# Patient Record
Sex: MALE | Race: WHITE | HISPANIC OR LATINO | Employment: FULL TIME | URBAN - NONMETROPOLITAN AREA
[De-identification: names, ages, dates, MRNs, and addresses within clinical notes are randomized per-mention and may not be internally consistent; named-entity substitution may affect disease eponyms.]

---

## 2023-09-06 ENCOUNTER — TELEPHONE (OUTPATIENT)
Dept: OTOLARYNGOLOGY | Facility: CLINIC | Age: 38
End: 2023-09-06
Payer: COMMERCIAL

## 2023-09-12 ENCOUNTER — OFFICE VISIT (OUTPATIENT)
Dept: OTOLARYNGOLOGY | Facility: CLINIC | Age: 38
End: 2023-09-12
Payer: COMMERCIAL

## 2023-09-12 VITALS
DIASTOLIC BLOOD PRESSURE: 85 MMHG | WEIGHT: 209.6 LBS | BODY MASS INDEX: 31.77 KG/M2 | HEART RATE: 83 BPM | TEMPERATURE: 98.9 F | HEIGHT: 68 IN | SYSTOLIC BLOOD PRESSURE: 154 MMHG

## 2023-09-12 DIAGNOSIS — K11.8 MASS OF RIGHT PAROTID GLAND: Primary | ICD-10-CM

## 2023-09-12 DIAGNOSIS — D49.0 PAROTID TUMOR: ICD-10-CM

## 2023-09-12 NOTE — PATIENT INSTRUCTIONS
CT neck ordered    Call for problems     CONTACT INFORMATION:  The main office phone number is [phone]. For emergencies after hours and on weekends, this number will convert over to our answering service and the on call provider will answer. Please try to keep non emergent phone calls/ questions to office hours 9am-5pm Monday through Friday.     [facility]  As an alternative, you can sign up and use the Epic MyChart system for more direct and quicker access for non emergent questions/ problems.  [facility] allows you to send messages to your doctor, view your test results, renew your prescriptions, schedule appointments, and more. To sign up, go to [employer] and click on the Sign Up Now link in the New User? box. Enter your [employer] Activation Code exactly as it appears below along with the last four digits of your Social Security Number and your Date of Birth () to complete the sign-up process. If you do not sign up before the expiration date, you must request a new code.    [employer]onate Activation Code: [email or web address]  Expires: 10/8/2023  8:40 AM    If you have questions, you can email [email or web address] or call [phone] to talk to our [employer] staff. Remember, [employer] is NOT to be used for urgent needs. For medical emergencies, dial 911.         [phone]

## 2023-09-12 NOTE — PROGRESS NOTES
Jaswant Stovall Jr, MD  [facility] [place] [facility] EAR NOSE & THROAT  [address], [address]  [place] [address]  Fax [phone]  Phone [phone]      Visit Type: NEW PATIENT   Chief Complaint   Patient presents with    Neck Mass     Bilateral-been present since his surgery 9 years ago  H/O exc left parotid        HPI  Accompanied by: Wife  He complains of neck mass.   He has had Left neck mass. Removed 9 yrs ago. Dr Cody removed.  Has had U/S and told patient bilateral neck mass.  Patient feels something in neck.  Smoke- none  Drink- socially  Feels lumps on Right side. He has noted for 3-4 yrs.   Surgery on Left side. Told patient that some present.      History reviewed. No pertinent past medical history.    Past Surgical History:   Procedure Laterality Date    PAROTID BIOPSY/TUMOR EXCISION Left        Family History: His family history is not on file.     Social History: He  reports that he has never smoked. He does not have any smokeless tobacco history on file. He reports current alcohol use. He reports that he does not use drugs.    Home Medications:       Allergies:  He has No Known Allergies.       Vital Signs:   Temp:  [98.9 °F (37.2 °C)] 98.9 °F (37.2 °C)  Heart Rate:  [83] 83  BP: (154)/(85) 154/85  ENT Physical Exam  Constitutional  Appearance: patient appears well-developed and well-nourished,  Communication/Voice: communication appropriate for developmental age; vocal quality normal;  Head and Face  Appearance: head appears normal, face appears normal and face appears atraumatic;  Palpation: facial palpation normal;  Salivary: glands normal; no salivary tenderness noted; Salivary comments: Laron incision well-healed; right tail of parotid with fatty infiltration, no definitive tumor  Ear  Hearing: intact;  Auricles: bilateral auricles normal;  External Mastoids: right external mastoid normal; left external mastoid normal;  Ear Canals: bilateral  ear canals normal;  Tympanic Membranes: bilateral tympanic membranes normal;  Nose  External Nose: nares patent bilaterally; external nose normal;  Internal Nose: nasal mucosa normal; septum normal; bilateral inferior turbinates normal;  Oral Cavity/Oropharynx  Lips: normal;  Teeth: normal;  Gums: gingiva normal;  Tongue: normal;  Oral mucosa: normal;  Hard palate: normal;  Soft palate: normal;  Tonsils: normal;  Base of Tongue: normal;  Posterior pharyngeal wall: normal;  Neck  Neck: neck normal; neck palpation normal;  Thyroid: thyroid normal;  Respiratory  Inspection: breathing unlabored; normal breathing rate;  Cardiovascular  Inspection: extremities are warm and well perfused; no peripheral edema present;  Lymphatic  Palpation: lymph nodes normal;  Neurovestibular  Mental Status: alert and oriented;  Psychiatric: mood normal; affect is appropriate;       Result Review    RESULTS REVIEW    I have reviewed the patients old records in the chart.   I have reviewed the patients old records in the chart.  The following results/records were reviewed:  I reviewed the patient's new imaging results and agree with the interpretation.  I reviewed the patient's other test results and agree with the interpretation   Referral for Localized swelling, mass and lump, neck (08/29/2023)   SCANNED - IMAGING (08/22/2023)   PROGRESS NOTES - SCAN - PROG NOTE_[facility]_08/03/23 (08/03/2023)    RADIOLOGY - SCAN - US NECK THYROID_[facility]_08/22/23 (08/22/2023)-report reviewed with the findings of no abnormalities on ultrasound.    SUMMARY OF CARE - SCAN - SUMMARY OF CARE_[facility]_08/28/23 (08/28/2023)    REFERRAL/PRE-AUTH MRN - SCAN - REF TO ENT_[facility]_08/28/23 (08/28/2023)  Assessment & Plan    Diagnoses and all orders for this visit:    1. Mass of right parotid gland (Primary)  Comments:  Tail of the parotid  Orders:  -     CT Soft Tissue Neck With Contrast; Future    2. Parotid tumor  Comments:  Left, by history, unknown pathology  Orders:  -     CT  Soft Tissue Neck With Contrast; Future       Medical and surgical options were discussed including observation, continued medical management, medication modification, surgical management, and CT scanning. Risks, benefits and alternatives were discussed and questions were answered. After considering the options, the patient decided to proceed with CT scanning.  Patient appears to have no definitive mass.  He is extremely concerned about this.  Despite the fact that he has an ultrasound which reveals no mass, he wishes further evaluation.  I have ordered CT scanning of his parotids.  If he has CT scan with contrast this may elucidate deeper masses.  I will see the patient back after CT scanning.  CT scan of the neck  Call for any growth in the face    Return RTC after CT neck, for Recheck R parotid, CT neck.      Electronically signed by Jaswant Stovall Jr, MD 09/12/23 2:30 PM CDT.

## 2023-09-27 ENCOUNTER — HOSPITAL ENCOUNTER (OUTPATIENT)
Dept: CT IMAGING | Facility: HOSPITAL | Age: 38
Discharge: HOME | End: 2023-09-27
Admitting: OTOLARYNGOLOGY
Payer: COMMERCIAL

## 2023-09-27 DIAGNOSIS — D49.0 PAROTID TUMOR: ICD-10-CM

## 2023-09-27 DIAGNOSIS — K11.8 MASS OF RIGHT PAROTID GLAND: ICD-10-CM

## 2023-09-27 LAB — CREAT BLDA-MCNC: 0.8 MG/DL (ref 0.6–1.3)

## 2023-09-27 PROCEDURE — 82565 ASSAY OF CREATININE: CPT

## 2023-09-27 PROCEDURE — 25510000001 IOPAMIDOL 61 % SOLUTION: Performed by: OTOLARYNGOLOGY

## 2023-09-27 PROCEDURE — 70491 CT SOFT TISSUE NECK W/DYE: CPT

## 2023-09-27 RX ADMIN — IOPAMIDOL 100 ML: 612 INJECTION, SOLUTION INTRAVENOUS at 15:06

## 2023-10-23 ENCOUNTER — TELEPHONE (OUTPATIENT)
Dept: OTOLARYNGOLOGY | Facility: CLINIC | Age: 38
End: 2023-10-23
Payer: COMMERCIAL

## 2023-10-23 NOTE — TELEPHONE ENCOUNTER
----- Message from Jaswant Stovall Jr., MD sent at 10/20/2023  1:17 PM CDT -----  Regarding: CT results  Please call patient tell him CT results do not indicate there is a mass in his parotid.  I will be happy to see him back in the office to discuss this with him.  ----- Message -----  From: Rhonda, Rad Results [place] In  Sent: 9/28/2023  10:11 AM CDT  To: Jaswant Stovall Jr., MD

## 2023-10-23 NOTE — TELEPHONE ENCOUNTER
I spoke to patient's wife, gave results of CT scan. Dr Stovall will discuss in detail at his follow up appt on 11-15-23. She VU

## 2023-10-23 NOTE — TELEPHONE ENCOUNTER
----- Message from Jaswant Stovall Jr., MD sent at 10/20/2023  1:17 PM CDT -----  Regarding: CT results  Please call patient tell him CT results do not indicate there is a mass in his parotid.  I will be happy to see him back in the office to discuss this with him.  ----- Message -----  From: Rhonda, Rad Results [place] In  Sent: 9/28/2023  10:11 AM CDT  To: Jaswant Stovall Jr., MD

## 2023-11-15 ENCOUNTER — OFFICE VISIT (OUTPATIENT)
Dept: OTOLARYNGOLOGY | Facility: CLINIC | Age: 38
End: 2023-11-15
Payer: COMMERCIAL

## 2023-11-15 VITALS
HEART RATE: 78 BPM | WEIGHT: 209 LBS | DIASTOLIC BLOOD PRESSURE: 84 MMHG | SYSTOLIC BLOOD PRESSURE: 153 MMHG | TEMPERATURE: 98.4 F | HEIGHT: 68 IN | BODY MASS INDEX: 31.67 KG/M2

## 2023-11-15 DIAGNOSIS — K11.8 MASS OF RIGHT PAROTID GLAND: Primary | ICD-10-CM

## 2023-11-15 DIAGNOSIS — D49.0 PAROTID TUMOR: ICD-10-CM

## 2023-11-15 NOTE — PROGRESS NOTES
Jaswant Stovall Jr, MD  [facility] EAR NOSE & THROAT  [address], [address]  [place] [address]  Fax [phone]  Phone [phone]      Visit Type: FOLLOW UP   Chief Complaint   Patient presents with    Follow up after CT     Parotid mass        HPI  Accompanied by: Wife  He presents for a follow up evaluation. No changes.  CT face completed     History reviewed. No pertinent past medical history.    Past Surgical History:   Procedure Laterality Date    PAROTID BIOPSY/TUMOR EXCISION Left        Family History: His family history is not on file.     Social History: He  reports that he has never smoked. He does not have any smokeless tobacco history on file. He reports current alcohol use. He reports that he does not use drugs.    Home Medications:       Allergies:  He has No Known Allergies.       Vital Signs:   Temp:  [98.4 °F (36.9 °C)] 98.4 °F (36.9 °C)  Heart Rate:  [78] 78  BP: (153)/(84) 153/84  ENT Physical Exam  Constitutional  Appearance: patient appears well-developed and well-nourished,  Communication/Voice: communication appropriate for developmental age; vocal quality normal;  Head and Face  Appearance: head appears normal, face appears normal and face appears atraumatic;  Palpation: facial palpation normal;  Salivary: glands normal; no salivary tenderness noted; Salivary comments: Laron incision well-healed; right tail of parotid with fatty infiltration, no definitive tumor  Ear  Hearing: intact;  Auricles: bilateral auricles normal;  External Mastoids: right external mastoid normal; left external mastoid normal;  Ear Canals: bilateral ear canals normal;  Tympanic Membranes: bilateral tympanic membranes normal;  Nose  External Nose: nares patent bilaterally; external nose normal;  Internal Nose: bilateral intranasal mucosa erythematous; nasal septal deviation present; deviation is to the right and to the left, septal deviation is  severe; Septum comments: L to R mid mod to severe; R to L low moderate   bilateral inferior turbinates erythematous;  Oral Cavity/Oropharynx  Lips: normal;  Teeth: normal;  Gums: gingiva normal;  Tongue: normal;  Oral mucosa: normal;  Hard palate: normal;  Soft palate: normal;  Tonsils: normal;  Base of Tongue: normal;  Posterior pharyngeal wall: normal;  Neck  Neck: neck normal; neck palpation normal;  Thyroid: thyroid normal;  Respiratory  Inspection: breathing unlabored; normal breathing rate;  Cardiovascular  Inspection: extremities are warm and well perfused; no peripheral edema present;  Lymphatic  Palpation: lymph nodes normal;  Neurovestibular  Mental Status: alert and oriented;  Psychiatric: mood normal; affect is appropriate;           Result Review    RESULTS REVIEW    I have reviewed the patients old records in the chart.   I have reviewed the patients old records in the chart.  The following results/records were reviewed:  No results found for this or any previous visit.     CT Soft Tissue Neck With Contrast (09/27/2023 15:05)-I have reviewed the CT scan independently, agree with radiology interpretation.  I see no sign of parotid tumor.    Assessment & Plan    Diagnoses and all orders for this visit:    1. Mass of right parotid gland (Primary)  Comments:  None present on CT or examination    2. Parotid tumor  Comments:  None present on CT or examination left side       Medical and surgical options were discussed including observation, continued medical management, medication modification, and surgical management. Risks, benefits and alternatives were discussed and questions were answered. After considering the options, the patient decided to proceed with observation.  Patient appears to have no evidence of clear parotid tumor.  He does have fatty infiltration in the right tail of the parotid with mild adenopathy.  This is indicated on the CT scan as well.  I recommended continue observation.  I will  follow the patient in 6 months and reexamine him.  No medications  For tumor growth    My Chart:  Patient is using My Chart    Patient, Spouse understand(s) and agree(s) with the treatment plan as described.    Return in about 6 months (around 5/15/2024) for Recheck parotids.      Electronically signed by Jaswant Stovall Jr, MD 11/15/23 3:42 PM CST.

## 2023-11-15 NOTE — PATIENT INSTRUCTIONS
Call for any growth     CONTACT INFORMATION:  The main office phone number is [phone]. For emergencies after hours and on weekends, this number will convert over to our answering service and the on call provider will answer. Please try to keep non emergent phone calls/ questions to office hours 9am-5pm Monday through Friday.     [facility]  As an alternative, you can sign up and use the Epic MyChart system for more direct and quicker access for non emergent questions/ problems.  [facility] allows you to send messages to your doctor, view your test results, renew your prescriptions, schedule appointments, and more. To sign up, go to [employer] and click on the Sign Up Now link in the New User? box. Enter your [facility] Activation Code exactly as it appears below along with the last four digits of your Social Security Number and your Date of Birth () to complete the sign-up process. If you do not sign up before the expiration date, you must request a new code.    [facility] Activation Code: Activation code not generated  Current [facility] Status: Active    If you have questions, you can email [email or web address] or call [phone] to talk to our [facility] staff. Remember, [facility] is NOT to be used for urgent needs. For medical emergencies, dial 911.

## 2024-07-22 ENCOUNTER — OFFICE VISIT (OUTPATIENT)
Dept: OTOLARYNGOLOGY | Facility: CLINIC | Age: 39
End: 2024-07-22
Payer: COMMERCIAL

## 2024-07-22 VITALS
TEMPERATURE: 98 F | SYSTOLIC BLOOD PRESSURE: 122 MMHG | HEART RATE: 81 BPM | DIASTOLIC BLOOD PRESSURE: 73 MMHG | WEIGHT: 206 LBS | BODY MASS INDEX: 31.32 KG/M2

## 2024-07-22 DIAGNOSIS — K11.8 MASS OF RIGHT PAROTID GLAND: Primary | ICD-10-CM

## 2024-07-22 DIAGNOSIS — D49.0 PAROTID TUMOR: ICD-10-CM

## 2024-07-22 DIAGNOSIS — K11.20 PAROTID SIALADENITIS: ICD-10-CM

## 2024-07-22 NOTE — PROGRESS NOTES
Jaswant Stovall Jr, MD  [facility] EAR NOSE & THROAT  [address], [address]  [place] [address]  Fax [phone]  Phone [phone]      Visit Type: FOLLOW UP   Chief Complaint   Patient presents with    follow up mass of right parotid gland     Feels like it has grown since last visit           HPI  Accompanied by:Wife  He presents for a follow up evaluation.  Patient states that he feels the right gland is slightly more full.  He notes no masses.  Wife is translating for us during this counter.    No past medical history on file.    Past Surgical History:   Procedure Laterality Date    PAROTID BIOPSY/TUMOR EXCISION Left        Family History: His family history is not on file.     Social History: He  reports that he has never smoked. He does not have any smokeless tobacco history on file. He reports current alcohol use. He reports that he does not use drugs.    Home Medications:       Allergies:  He has No Known Allergies.       Vital Signs:   Temp:  [98 °F (36.7 °C)] 98 °F (36.7 °C)  Heart Rate:  [81] 81  BP: (122)/(73) 122/73  ENT Physical Exam  Constitutional  Appearance: patient appears well-developed and well-nourished,  Communication/Voice: communication appropriate for developmental age; vocal quality normal;  Head and Face  Appearance: head appears normal, face appears normal and face appears atraumatic;  Palpation: facial palpation normal; no TMJ crepitus noted;  Salivary: glands normal; no salivary tenderness noted; Salivary comments: Laron incision well-healed; right tail of parotid with fatty infiltration, no definitive tumor, slightly more full than last visit  Ear  Hearing: intact;  Auricles: bilateral auricles normal;  External Mastoids: right external mastoid normal; left external mastoid normal;  Ear Canals: bilateral ear canals normal;  Tympanic Membranes: bilateral tympanic membranes normal;  Nose  External Nose: nares patent  bilaterally; external nose normal;  Internal Nose: bilateral intranasal mucosa erythematous; nasal septal deviation present; deviation is to the right and to the left, septal deviation is severe; Septum comments: L to R mid mod to severe; R to L low moderate   bilateral inferior turbinates erythematous;  Oral Cavity/Oropharynx  Lips: normal;  Teeth: normal;  Gums: gingiva normal;  Tongue: normal;  Oral mucosa: normal;  Hard palate: normal;  Soft palate: normal;  Tonsils: normal;  Base of Tongue: normal;  Posterior pharyngeal wall: normal;  Neck  Neck: neck normal; neck palpation normal;  Thyroid: thyroid normal;  Respiratory  Inspection: breathing unlabored; normal breathing rate;  Cardiovascular  Inspection: extremities are warm and well perfused; no peripheral edema present;  Lymphatic  Palpation: lymph nodes normal;  Neurovestibular  Mental Status: alert and oriented;  Psychiatric: mood normal; affect is appropriate;           Result Review       RESULTS REVIEW    I have reviewed the patients old records in the chart.   I have reviewed the patients old records in the chart.        Assessment & Plan  Mass of right parotid gland    Parotid tumor    Parotid sialadenitis       Orders Placed This Encounter   Procedures    CT Soft Tissue Neck With Contrast       Diagnosis Plan   1. Mass of right parotid gland  CT Soft Tissue Neck With Contrast    None detected today      2. Parotid tumor  CT Soft Tissue Neck With Contrast    None palpable today      3. Parotid sialadenitis      Suspect right mild parotid sialadenitis             Medical and surgical options were discussed including observation, continued medical management, medication modification, and surgical management. Risks, benefits and alternatives were discussed and questions were answered. After considering the options, the patient decided to proceed with observation.  Patient appears to have slightly more adiposis of the right inferior parotid.  I feel no  evidence of tumor.  The patient is coming up on 1 year since his last CT scan.  I will plan a CT scan at 1 year.  If he has no further evidence of tumor, I will follow clinically.  Salivary gland care  CT scan at 1 year  Call for any changes    My Chart:  Patient is using My Chart    Patient, Spouse understand(s) and agree(s) with the treatment plan as described.    Return in about 3 months (around 10/22/2024) for Recheck R parotid, CT.        Electronically signed by Jaswant Stovall Jr, MD 07/22/24 3:37 PM CDT.

## 2024-08-05 ENCOUNTER — HOSPITAL ENCOUNTER (OUTPATIENT)
Dept: CT IMAGING | Facility: HOSPITAL | Age: 39
Discharge: HOME | End: 2024-08-05
Admitting: OTOLARYNGOLOGY
Payer: COMMERCIAL

## 2024-08-05 DIAGNOSIS — K11.8 MASS OF RIGHT PAROTID GLAND: ICD-10-CM

## 2024-08-05 DIAGNOSIS — D49.0 PAROTID TUMOR: ICD-10-CM

## 2024-08-05 PROCEDURE — 25510000001 IOPAMIDOL 61 % SOLUTION: Performed by: OTOLARYNGOLOGY

## 2024-08-05 PROCEDURE — 70491 CT SOFT TISSUE NECK W/DYE: CPT

## 2024-08-05 RX ADMIN — IOPAMIDOL 100 ML: 612 INJECTION, SOLUTION INTRAVENOUS at 14:58

## 2024-08-06 ENCOUNTER — TELEPHONE (OUTPATIENT)
Dept: OTOLARYNGOLOGY | Facility: CLINIC | Age: 39
End: 2024-08-06
Payer: COMMERCIAL

## 2024-08-06 NOTE — TELEPHONE ENCOUNTER
----- Message from Jaswant Stovall sent at 8/6/2024  2:25 PM CDT -----  Regarding: CT results  Please call patient tell him there is no evidence of a mass in either of the parotid glands.  He does have some fullness in the glands but no evidence of anything that requires surgery.  ----- Message -----  From: Rhonda, Rad Results [place] In  Sent: 8/5/2024   4:12 PM CDT  To: Jaswant Stovall Jr., MD

## 2024-08-06 NOTE — TELEPHONE ENCOUNTER
"I left  for patient with results of CT neck, per Dr Stovall \"There is no evidence of a mass in either of the parotid glands.  He does have some fullness in the glands but no evidence of anything that requires surgery.\" He has follow up appt on 10-21-24 at 2:45. Call with any questions  "

## 2024-10-21 ENCOUNTER — OFFICE VISIT (OUTPATIENT)
Dept: OTOLARYNGOLOGY | Facility: CLINIC | Age: 39
End: 2024-10-21
Payer: COMMERCIAL

## 2024-10-21 VITALS
HEIGHT: 68 IN | DIASTOLIC BLOOD PRESSURE: 88 MMHG | HEART RATE: 83 BPM | BODY MASS INDEX: 32.13 KG/M2 | SYSTOLIC BLOOD PRESSURE: 132 MMHG | TEMPERATURE: 98.4 F | WEIGHT: 212 LBS

## 2024-10-21 DIAGNOSIS — K11.8 MASS OF RIGHT PAROTID GLAND: Primary | ICD-10-CM

## 2024-10-21 DIAGNOSIS — K11.20 PAROTID SIALADENITIS: ICD-10-CM

## 2024-10-21 PROCEDURE — 1159F MED LIST DOCD IN RCRD: CPT | Performed by: OTOLARYNGOLOGY

## 2024-10-21 PROCEDURE — 99213 OFFICE O/P EST LOW 20 MIN: CPT | Performed by: OTOLARYNGOLOGY

## 2024-10-21 PROCEDURE — 1160F RVW MEDS BY RX/DR IN RCRD: CPT | Performed by: OTOLARYNGOLOGY

## 2024-10-21 NOTE — PROGRESS NOTES
Jaswant Stovall Jr, MD  [facility] [place] EAR NOSE & THROAT  [address], [address]  [place] [address]  Fax [phone]  Phone [phone]      Visit Type: FOLLOW UP   Chief Complaint   Patient presents with    recheck parotid gland     Previous mass of parotid gland.  He states he is not having any symptoms            HPI  Accompanied by: Wife  He presents for a follow up evaluation. No changes noted right face     History reviewed. No pertinent past medical history.    Past Surgical History:   Procedure Laterality Date    PAROTID BIOPSY/TUMOR EXCISION Left        Family History: His family history is not on file.     Social History: He  reports that he has never smoked. He does not have any smokeless tobacco history on file. He reports current alcohol use. He reports that he does not use drugs.    Home Medications:       Allergies:  He has No Known Allergies.       Vital Signs:      ENT Physical Exam  Constitutional  Appearance: patient appears well-developed and well-nourished,  Communication/Voice: communication appropriate for developmental age; vocal quality normal;  Head and Face  Appearance: head appears normal, face appears normal and face appears atraumatic;  Palpation: facial palpation normal; no TMJ crepitus noted;  Salivary: glands normal; no salivary tenderness noted; Salivary comments: Left Laron incision well-healed; right tail of parotid with fatty infiltration, no definitive tumor, slightly more full than left side  Ear  Hearing: intact;  Auricles: bilateral auricles normal;  External Mastoids: right external mastoid normal; left external mastoid normal;  Ear Canals: bilateral ear canals normal;  Tympanic Membranes: bilateral tympanic membranes normal;  Nose  External Nose: nares patent bilaterally; external nose normal;  Internal Nose: bilateral intranasal mucosa erythematous (Mild); nasal septal deviation present; deviation is to the  right and to the left, septal deviation is severe; Septum comments: L to R mid mod to severe; R to L low moderate   bilateral inferior turbinates erythematous (Mild);  Oral Cavity/Oropharynx  Lips: normal;  Teeth: normal;  Gums: gingiva normal;  Tongue: normal;  Oral mucosa: normal;  Hard palate: normal;  Soft palate: normal;  Tonsils: normal;  Base of Tongue: normal;  Posterior pharyngeal wall: normal;  Neck  Neck: scars (Left Laron incision, well-healed, acceptable scar) present; neck palpation normal; no neck mass present;  Thyroid: thyroid normal;  Neck comments: Right parotid, fullness in the tail, fatty infiltrate  Respiratory  Inspection: breathing unlabored; normal breathing rate;  Cardiovascular  Inspection: extremities are warm and well perfused; no peripheral edema present;  Lymphatic  Palpation: lymph nodes normal;  Neurovestibular  Mental Status: alert and oriented;  Psychiatric: mood normal; affect is appropriate;           Result Review       RESULTS REVIEW    I have reviewed the patients old records in the chart.   I have reviewed the patients old records in the chart.        Assessment & Plan  Mass of right parotid gland  No mass present today  Parotid sialadenitis  With fatty infiltrate            Medical and surgical options were discussed including observation, continued medical management, medication modification, and surgical management. Risks, benefits and alternatives were discussed and questions were answered. After considering the options, the patient decided to proceed with observation.  Patient appears to have no mass on the right side.  His glands are slightly asymmetric because of prior left superficial parotid removal.  Patient remains very concerned.  I will keep him on salivary gland care.  He is to call for any changes in his right face.  Salivary gland care  Call for problems    My Chart:  Patient is using My Chart    Patient, Spouse understand(s) and agree(s) with the treatment  plan as described.    Return if symptoms worsen or fail to improve, for Recheck Parotid.        Electronically signed by Jaswant Stovall Jr, MD 10/21/24 3:42 PM CDT.